# Patient Record
Sex: FEMALE | HISPANIC OR LATINO | URBAN - METROPOLITAN AREA
[De-identification: names, ages, dates, MRNs, and addresses within clinical notes are randomized per-mention and may not be internally consistent; named-entity substitution may affect disease eponyms.]

---

## 2018-08-22 ENCOUNTER — OFFICE VISIT (OUTPATIENT)
Dept: URBAN - METROPOLITAN AREA CLINIC 17 | Facility: CLINIC | Age: 73
End: 2018-08-22
Payer: MEDICARE

## 2018-08-22 PROCEDURE — 99213 OFFICE O/P EST LOW 20 MIN: CPT | Performed by: OPHTHALMOLOGY

## 2018-08-22 ASSESSMENT — INTRAOCULAR PRESSURE
OD: 16
OS: 27

## 2018-08-22 NOTE — IMPRESSION/PLAN
Impression: Diagnosis: Primary open-angle glaucoma, bilateral, severe stage. Code: V41.1912.
s/p removal of AQ shunt OS (4/18/17). Stable IOP OD, IOP OS improved. Plan: Discussed diagnosis, IOP, ONH, glaucoma management and risks with patient and daughter/POA. Advised patient to continue using Lumigan QHS OU, Dorzolamide BID OU, Timolol BID OU and Brimonidine BID OD only and Pred Acetate BID OS only. Patient and POA understand importance of drop usage. Will continue to monitor pressure OU.

## 2018-11-26 ENCOUNTER — OFFICE VISIT (OUTPATIENT)
Dept: URBAN - METROPOLITAN AREA CLINIC 17 | Facility: CLINIC | Age: 73
End: 2018-11-26
Payer: MEDICARE

## 2018-11-26 PROCEDURE — 99213 OFFICE O/P EST LOW 20 MIN: CPT | Performed by: OPHTHALMOLOGY

## 2018-11-26 RX ORDER — DORZOLAMIDE HYDROCHLORIDE AND TIMOLOL MALEATE 20; 5 MG/ML; MG/ML
SOLUTION/ DROPS OPHTHALMIC
Qty: 1 | Refills: 5 | Status: INACTIVE
Start: 2018-11-26 | End: 2019-03-25

## 2018-11-26 ASSESSMENT — INTRAOCULAR PRESSURE
OD: 19
OS: 45

## 2018-11-26 NOTE — IMPRESSION/PLAN
Impression: Diagnosis: Primary open-angle glaucoma, bilateral, severe stage. Code: Q86.9618.
s/p removal of AQ shunt OS (4/18/17). IOP OD doing well - 
palliative therapy OS - Plan: Discussed diagnosis, IOP, ONH, glaucoma management and risks with patient and daughter/POA. Advised patient to continue using Lumigan QHS OU, Dorzolamide BID OU, Timolol BID OU - or combo dorzolamide/timolol  and Brimonidine BID OD only and Pred Acetate BID OS only. Patient and POA understand importance of drop usage. Will continue to monitor pressure OU.

## 2019-03-25 ENCOUNTER — OFFICE VISIT (OUTPATIENT)
Dept: URBAN - METROPOLITAN AREA CLINIC 17 | Facility: CLINIC | Age: 74
End: 2019-03-25
Payer: MEDICARE

## 2019-03-25 PROCEDURE — 99213 OFFICE O/P EST LOW 20 MIN: CPT | Performed by: OPHTHALMOLOGY

## 2019-03-25 RX ORDER — DORZOLAMIDE HYDROCHLORIDE AND TIMOLOL MALEATE 20; 5 MG/ML; MG/ML
SOLUTION/ DROPS OPHTHALMIC
Qty: 1 | Refills: 5 | Status: INACTIVE
Start: 2019-03-25 | End: 2019-05-03

## 2019-03-25 RX ORDER — PREDNISOLONE ACETATE 10 MG/ML
1 % SUSPENSION/ DROPS OPHTHALMIC
Qty: 1 | Refills: 2 | Status: INACTIVE
Start: 2019-03-25 | End: 2019-05-20

## 2019-03-25 ASSESSMENT — INTRAOCULAR PRESSURE
OS: 36
OD: 21

## 2019-03-25 NOTE — IMPRESSION/PLAN
Impression: Diagnosis: Primary open-angle glaucoma, bilateral, severe stage. Code: N34.0932.
s/p removal of AQ shunt OS (4/18/17). IOP OD doing well - 
palliative therapy OS - Plan: Discussed diagnosis, IOP, ONH, glaucoma management and risks with patient and daughter/POA. Advised patient to continue using Lumigan QHS OU, Dorzolamide BID OU, Timolol BID OU - or combo dorzolamide/timolol  and Brimonidine BID OD only and Pred Acetate BID OS only. Patient and POA understand importance of drop usage. Will continue to monitor pressure OU.

## 2019-06-26 ENCOUNTER — OFFICE VISIT (OUTPATIENT)
Dept: URBAN - METROPOLITAN AREA CLINIC 17 | Facility: CLINIC | Age: 74
End: 2019-06-26
Payer: MEDICARE

## 2019-06-26 PROCEDURE — 99213 OFFICE O/P EST LOW 20 MIN: CPT | Performed by: OPHTHALMOLOGY

## 2019-06-26 ASSESSMENT — INTRAOCULAR PRESSURE
OS: 38
OD: 24

## 2019-06-26 NOTE — IMPRESSION/PLAN
Impression: Diagnosis: Primary open-angle glaucoma, bilateral, severe stage. Code: H15.1395.
s/p removal of AQ shunt OS (4/18/17). IOP OU elevated - Patient has been at a clinic care for 3 weeks and has not been constant with drops. palliative therapy OS - Plan: Discussed diagnosis, IOP, ONH, glaucoma management and risks with patient and daughter/POA. Advised patient to continue using Lumigan QHS OU, Dorzolamide BID OU, Timolol BID OU - or combo dorzolamide/timolol  and Brimonidine BID OD only and Pred Acetate QID OS -PRN (comfort). Patient and POA understand importance of drop usage. Will continue to monitor pressure OU. Discussed treatment options for OS if patient complains of severe eye pain.

## 2019-10-28 ENCOUNTER — OFFICE VISIT (OUTPATIENT)
Dept: URBAN - METROPOLITAN AREA CLINIC 17 | Facility: CLINIC | Age: 74
End: 2019-10-28
Payer: MEDICARE

## 2019-10-28 PROCEDURE — 99213 OFFICE O/P EST LOW 20 MIN: CPT | Performed by: OPHTHALMOLOGY

## 2019-10-28 RX ORDER — BRIMONIDINE TARTRATE 1.5 MG/ML
0.15 % SOLUTION OPHTHALMIC
Qty: 3 | Refills: 3 | Status: INACTIVE
Start: 2019-10-28 | End: 2020-02-18

## 2019-10-28 RX ORDER — DORZOLAMIDE HYDROCHLORIDE AND TIMOLOL MALEATE 20; 5 MG/ML; MG/ML
SOLUTION/ DROPS OPHTHALMIC
Qty: 3 | Refills: 3 | Status: INACTIVE
Start: 2019-10-28 | End: 2020-04-06

## 2019-10-28 RX ORDER — PREDNISOLONE ACETATE 10 MG/ML
1 % SUSPENSION/ DROPS OPHTHALMIC
Qty: 1 | Refills: 5 | Status: INACTIVE
Start: 2019-10-28 | End: 2020-02-24

## 2019-10-28 RX ORDER — DORZOLAMIDE HCL 20 MG/ML
2 % SOLUTION/ DROPS OPHTHALMIC
Qty: 3 | Refills: 3 | Status: INACTIVE
Start: 2019-10-28 | End: 2020-06-15

## 2019-10-28 RX ORDER — BIMATOPROST 0.1 MG/ML
0.01 % SOLUTION/ DROPS OPHTHALMIC
Qty: 3 | Refills: 3 | Status: INACTIVE
Start: 2019-10-28 | End: 2020-06-15

## 2019-10-28 ASSESSMENT — INTRAOCULAR PRESSURE
OS: 36
OD: 17

## 2019-10-28 NOTE — IMPRESSION/PLAN
Impression: Diagnosis: Primary open-angle glaucoma, bilateral, severe stage. Code: K56.6109.
s/p removal of AQ shunt OS (4/18/17). IOP OD lower, OS still elevated - Patient was not compliant with drops x 1 month when in nursing home. palliative therapy OS - Plan: Discussed diagnosis, IOP, ONH, glaucoma management and risks with patient and daughter/POA. Advised patient to continue using Lumigan QHS OU (okay to refill Latanoprost QHS OU if Lumigan too expensive), Dorzolamide BID OU, Timolol BID OU - or combo dorzolamide/timolol  and Brimonidine BID OD only and Prednisolone Acetate QID OS for maintenance and for comfort. Patient and POA understand importance of drop usage. Will continue to monitor pressure OU. Discussed treatment options for OS if patient complains of severe eye pain.

## 2020-06-15 ENCOUNTER — OFFICE VISIT (OUTPATIENT)
Dept: URBAN - METROPOLITAN AREA CLINIC 17 | Facility: CLINIC | Age: 75
End: 2020-06-15
Payer: MEDICARE

## 2020-06-15 PROCEDURE — 99213 OFFICE O/P EST LOW 20 MIN: CPT | Performed by: OPHTHALMOLOGY

## 2020-06-15 RX ORDER — DORZOLAMIDE HYDROCHLORIDE AND TIMOLOL MALEATE 20; 5 MG/ML; MG/ML
SOLUTION/ DROPS OPHTHALMIC
Qty: 10 | Refills: 3 | Status: INACTIVE
Start: 2020-06-15 | End: 2021-01-25

## 2020-06-15 RX ORDER — BRIMONIDINE TARTRATE 1.5 MG/ML
0.15 % SOLUTION OPHTHALMIC
Qty: 5 | Refills: 3 | Status: INACTIVE
Start: 2020-06-15 | End: 2021-01-25

## 2020-06-15 RX ORDER — BIMATOPROST 0.1 MG/ML
0.01 % SOLUTION/ DROPS OPHTHALMIC
Qty: 3 | Refills: 3 | Status: INACTIVE
Start: 2020-06-15 | End: 2021-01-25

## 2020-06-15 RX ORDER — PREDNISOLONE ACETATE 10 MG/ML
1 % SUSPENSION/ DROPS OPHTHALMIC
Qty: 5 | Refills: 5 | Status: INACTIVE
Start: 2020-06-15 | End: 2021-01-25

## 2020-06-15 ASSESSMENT — INTRAOCULAR PRESSURE
OS: 43
OD: 26

## 2020-06-15 NOTE — IMPRESSION/PLAN
Impression: Diagnosis: Primary open-angle glaucoma, bilateral, severe stage. Code: O65.7648.
s/p removal of AQ shunt OS (4/18/17). IOP OD lower, OS still elevated - Patient was not compliant with drops x 1 month when in nursing home. palliative therapy OS - Plan: Discussed diagnosis, IOP, ONH, glaucoma management and risks with patient and daughter/POA. Advised patient to continue using Lumigan QHS OU (okay to refill Latanoprost QHS OU if Lumigan too expensive), Dorzolamide BID OU, Timolol BID OU - or combo dorzolamide/timolol  and Brimonidine BID OD only and Prednisolone Acetate QID OS for maintenance and for comfort. Patient and POA understand importance of drop usage. Will continue to monitor pressure OU. Discussed treatment options for OS if patient complains of severe eye pain.

## 2020-07-06 ENCOUNTER — OFFICE VISIT (OUTPATIENT)
Dept: URBAN - METROPOLITAN AREA CLINIC 17 | Facility: CLINIC | Age: 75
End: 2020-07-06
Payer: MEDICARE

## 2020-07-06 DIAGNOSIS — H04.123 DRY EYE SYNDROME OF BILATERAL LACRIMAL GLANDS: ICD-10-CM

## 2020-07-06 DIAGNOSIS — Z96.1 PRESENCE OF INTRAOCULAR LENS: ICD-10-CM

## 2020-07-06 DIAGNOSIS — E11.3599: ICD-10-CM

## 2020-07-06 DIAGNOSIS — H40.1133 PRIMARY OPEN-ANGLE GLAUCOMA, BILATERAL, SEVERE STAGE: Primary | ICD-10-CM

## 2020-07-06 PROCEDURE — 99213 OFFICE O/P EST LOW 20 MIN: CPT | Performed by: OPTOMETRIST

## 2020-07-06 ASSESSMENT — INTRAOCULAR PRESSURE
OS: 47
OD: 23

## 2020-07-06 NOTE — IMPRESSION/PLAN
Impression: Diagnosis: Primary open-angle glaucoma, bilateral, severe stage. Code: W66.7823.
s/p removal of AQ shunt OS (4/18/17). IOP OD lower, OS still elevated - Patient was not compliant with drops x 1 month when in nursing home. palliative therapy OS - Plan: Discussed diagnosis, IOP, ONH, glaucoma management and risks with patient and daughter/POA. Advised patient to continue using Lumigan QHS OU Dorzolamide BID OU, Timolol BID OU - or combo dorzolamide/timolol  and Brimonidine BID OD only and Prednisolone Acetate if needed no more then 4 times OS for maintenance and for comfort. Patient and POA understand importance of drop usage. Will continue to monitor pressure OU. Discussed treatment options for OS if patient complains of severe eye pain.